# Patient Record
Sex: MALE | Race: OTHER | HISPANIC OR LATINO | URBAN - METROPOLITAN AREA
[De-identification: names, ages, dates, MRNs, and addresses within clinical notes are randomized per-mention and may not be internally consistent; named-entity substitution may affect disease eponyms.]

---
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
## 2021-04-04 PROBLEM — Z00.00 ENCOUNTER FOR PREVENTIVE HEALTH EXAMINATION: Status: ACTIVE | Noted: 2021-04-04

## 2022-02-05 ENCOUNTER — EMERGENCY (EMERGENCY)
Facility: HOSPITAL | Age: 22
LOS: 1 days | Discharge: HOME | End: 2022-02-05
Attending: EMERGENCY MEDICINE | Admitting: EMERGENCY MEDICINE
Payer: SELF-PAY

## 2022-02-05 VITALS
HEART RATE: 77 BPM | RESPIRATION RATE: 16 BRPM | DIASTOLIC BLOOD PRESSURE: 80 MMHG | TEMPERATURE: 98 F | HEIGHT: 70 IN | OXYGEN SATURATION: 99 % | WEIGHT: 179.9 LBS | SYSTOLIC BLOOD PRESSURE: 150 MMHG

## 2022-02-05 PROCEDURE — 99283 EMERGENCY DEPT VISIT LOW MDM: CPT

## 2022-02-05 PROCEDURE — 99053 MED SERV 10PM-8AM 24 HR FAC: CPT

## 2022-02-06 PROCEDURE — 73130 X-RAY EXAM OF HAND: CPT | Mod: 26,LT

## 2022-02-06 PROCEDURE — 73130 X-RAY EXAM OF HAND: CPT

## 2022-02-06 PROCEDURE — 99283 EMERGENCY DEPT VISIT LOW MDM: CPT | Mod: 25

## 2022-02-06 RX ORDER — ACETAMINOPHEN 500 MG
650 TABLET ORAL ONCE
Refills: 0 | Status: DISCONTINUED | OUTPATIENT
Start: 2022-02-06 | End: 2022-02-09

## 2022-02-06 NOTE — ED PROVIDER NOTE - PATIENT PORTAL LINK FT
You can access the FollowMyHealth Patient Portal offered by [facility] by registering at the following website: [email or web address]. By joining [employer]’s FollowMyHealth portal, you will also be able to view your health information using other applications (apps) compatible with our system.

## 2022-02-06 NOTE — ED PROVIDER NOTE - NSFOLLOWUPINSTRUCTIONS_ED_ALL_ED_FT
Finger Sprain, Adult      A finger sprain is a tear or stretch in a ligament in a finger. Ligaments are tissues that connect bones to each other.      What are the causes?    Finger sprains happen when something makes the bones in the hand move in an abnormal way. They are often caused by a fall or accident.      What increases the risk?    This condition is more likely to develop in people who:  •Participate in sports in which it is easy to fall, such as skiing.      •Play sports that involve catching an object, such as basketball.      •Have poor strength and flexibility.        What are the signs or symptoms?    Symptoms of this condition include:  •Pain or tenderness in the finger.      •Swelling in the finger.      •Bluish appearance to the finger.      •Bruising.      •Difficulty bending and flexing the finger.        How is this diagnosed?    This condition is diagnosed with an exam of your finger. Your health care provider may do an X-ray to see if any bones are broken or dislocated.      How is this treated?     Treatment for this condition depends on how severe the sprain is. It may involve:  •Preventing the finger from moving for a period of time. Your finger may be wrapped in a bandage (dressing), splint, or cast, or your finger may be taped to the fingers beside it (dmitri taping).      •Keeping the hand raised (elevated) above the level of the heart during rest and sleep.      •Medicines for pain.      •Exercises to strengthen the finger. These may be recommended when the finger has healed.      •Surgery to reconnect the ligament to a bone. This may be done if the ligament was torn all the way.        Follow these instructions at home:    If you have a splint:     • Do not put pressure on any part of the splint until it is fully hardened. This may take several hours.      •Wear the splint as told by your health care provider. Remove it only as told by your health care provider.      •Loosen the splint if your fingers tingle, become numb, or turn cold and blue.      •Keep the splint clean.    •If the splint is not waterproof:  •Do not let it get wet.      •Cover it with a watertight covering when you take a bath or a shower.        If you have a cast:     • Do not put pressure on any part of the cast until it is fully hardened. This may take several hours.      • Do not stick anything inside the cast to scratch your skin. Doing that increases your risk of infection.      •Check the skin around the cast every day. Tell your health care provider about any concerns.      •You may put lotion on dry skin around the edges of the cast. Do not put lotion on the skin underneath the cast.      •Keep the cast clean.    •If the cast is not waterproof:  •Do not let it get wet.      •Cover it with a watertight covering when you take a bath or shower.        Managing pain, stiffness, and swelling   •If directed, put ice on the injured area:  •If you have a removable splint, remove it as told by your health care provider.      •Put ice in a plastic bag.      •Place a towel between your skin and the bag or between your cast and the bag.      •Leave the ice on for 20 minutes, 2–3 times a day.        •Gently move your fingers often to avoid stiffness and to lessen swelling.      •Elevate the injured area above the level of your heart while you are sitting or lying down.      Medicines     •Take over-the-counter and prescription medicines only as told by your health care provider.      • Do not drive or use heavy machinery while taking prescription pain medicine.      General instructions     •Keep any dressings dry until your health care provider says they can be removed.      •Do exercises as told by your health care provider or physical therapist.      • Do not wear rings on your injured finger.      •Keep all follow-up visits as told by your health care provider. This is important.        Get help right away if:    •Your pain is not controlled with medicine.      •Your bruising or swelling gets worse.      •Your splint or cast is damaged.      •Your finger is numb or blue.      •Your finger feels colder to the touch than normal.      •You develop a fever.        Summary    •A finger sprain is a tear or stretch in a ligament in a finger. Ligaments are tissues that connect bones to each other.      •Finger sprains happen when something makes the bones in the hand move in an abnormal way. They are often caused by a fall or accident.      •This condition is diagnosed with an exam of your finger. Your health care provider may do an X-ray to see if any bones are broken or dislocated.      •Treatment for this condition depends on how severe the sprain is. Treatment may involve wearing a splint or cast. Surgery to reconnect the ligament to a bone may be needed if the ligament was torn all the way.      This information is not intended to replace advice given to you by your health care provider. Make sure you discuss any questions you have with your health care provider.      Document Revised: 11/30/2018 Document Reviewed: 03/09/2018    Elsevier Patient Education © 2021 [employer]
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
## 2023-01-05 NOTE — ED PROVIDER NOTE - PSYCHIATRIC, MLM
Alert and oriented to person, place, time/situation. normal mood and affect. no apparent risk to self or others. Show Additional Area 5: Yes

## 2024-05-04 ENCOUNTER — EMERGENCY (EMERGENCY)
Facility: HOSPITAL | Age: 24
LOS: 1 days | Discharge: HOME | End: 2024-05-04
Admitting: EMERGENCY MEDICINE
Payer: COMMERCIAL

## 2024-05-04 VITALS
HEART RATE: 84 BPM | SYSTOLIC BLOOD PRESSURE: 124 MMHG | RESPIRATION RATE: 18 BRPM | DIASTOLIC BLOOD PRESSURE: 80 MMHG | TEMPERATURE: 98 F | OXYGEN SATURATION: 99 %

## 2024-05-04 VITALS
RESPIRATION RATE: 18 BRPM | DIASTOLIC BLOOD PRESSURE: 72 MMHG | OXYGEN SATURATION: 98 % | HEART RATE: 75 BPM | TEMPERATURE: 97 F | SYSTOLIC BLOOD PRESSURE: 119 MMHG

## 2024-05-04 PROCEDURE — 12002 RPR S/N/AX/GEN/TRNK2.6-7.5CM: CPT

## 2024-05-04 PROCEDURE — 73120 X-RAY EXAM OF HAND: CPT | Mod: 26,LT

## 2024-05-04 PROCEDURE — 99284 EMERGENCY DEPT VISIT MOD MDM: CPT | Mod: 25

## 2024-05-04 RX ORDER — ACETAMINOPHEN 500 MG
975 TABLET ORAL ONCE
Refills: 0 | Status: COMPLETED | OUTPATIENT
Start: 2024-05-04 | End: 2024-05-04

## 2024-05-04 RX ORDER — TETANUS TOXOID, REDUCED DIPHTHERIA TOXOID AND ACELLULAR PERTUSSIS VACCINE, ADSORBED 5; 2.5; 8; 8; 2.5 [IU]/.5ML; [IU]/.5ML; UG/.5ML; UG/.5ML; UG/.5ML
0.5 SUSPENSION INTRAMUSCULAR ONCE
Refills: 0 | Status: COMPLETED | OUTPATIENT
Start: 2024-05-04 | End: 2024-05-04

## 2024-05-04 RX ADMIN — Medication 975 MILLIGRAM(S): at 21:57

## 2024-05-04 RX ADMIN — TETANUS TOXOID, REDUCED DIPHTHERIA TOXOID AND ACELLULAR PERTUSSIS VACCINE, ADSORBED 0.5 MILLILITER(S): 5; 2.5; 8; 8; 2.5 SUSPENSION INTRAMUSCULAR at 21:58
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
## 2024-05-04 NOTE — ED PROVIDER NOTE - PATIENT PORTAL LINK FT
You can access the FollowMyHealth Patient Portal offered by [facility] by registering at the following website: [email or web address]. By joining [employer]’s FollowMyHealth portal, you will also be able to view your health information using other applications (apps) compatible with our system.
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
## 2024-05-04 NOTE — ED PROVIDER NOTE - NSFOLLOWUPINSTRUCTIONS_ED_ALL_ED_FT
Please follow up with Brian Garcia within a week.   Orthopaedic Surgery  [address] [facility], [address]  [place] [address]  Phone: [phone]  Fax: [phone]  Follow Up Time:         Laceration    A laceration is a cut that goes through all of the layers of the skin and into the tissue that is right under the skin. Some lacerations heal on their own. Others need to be closed with skin adhesive strips, skin glue, stitches (sutures), or staples. Proper laceration care minimizes the risk of infection and helps the laceration to heal better.  If non-absorbable stitches or staples have been placed, they must be taken out within the time frame instructed by your healthcare provider.    SEEK IMMEDIATE MEDICAL CARE IF YOU HAVE ANY OF THE FOLLOWING SYMPTOMS: swelling around the wound, worsening pain, drainage from the wound, red streaking going away from your wound, inability to move finger or toe near the laceration, or discoloration of skin near the laceration.    You can use 500-1000mg Tylenol every 6 hours for pain - as needed.  This is an over-the-counter medications - please respect the warnings on the label. This medication come with certain risks and side effects that you need to discuss with your doctor, especially if you are taking it for a prolonged period.    You can use 400-600mg Ibuprofen (such as motrin or advil) every 6 to 8 hours as needed for pain control.  Take ibuprofen with food or milk to lessen stomach upset.  This is an over-the-counter medication please respect the warnings on the label. All medications come with certain risks and side effects that you need to discuss with your doctor, especially if you are taking them for a prolonged period.

## 2024-05-04 NOTE — ED PROCEDURE NOTE - NUMBER OF RETENTION SUTURES
8 Sski Pregnancy And Lactation Text: This medication is Pregnancy Category D and isn't considered safe during pregnancy. It is excreted in breast milk.
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
## 2024-05-04 NOTE — ED ADULT TRIAGE NOTE - CHIEF COMPLAINT QUOTE
pt s/p laceration to left hand over palm of hand and thumb area. laceration is deep, dressing over site. placed.

## 2024-05-04 NOTE — ED PROVIDER NOTE - CARE PROVIDER_API CALL
normal (ped)... Brian Garcia jennifer  Orthopaedic Surgery  [address] [place] [address], [facility] [address]  [place] [address]  Phone: [phone]  Fax: [phone]  Follow Up Time:

## 2024-05-05 PROBLEM — Z78.9 OTHER SPECIFIED HEALTH STATUS: Chronic | Status: ACTIVE | Noted: 2022-02-06

## 2024-05-05 RX ADMIN — Medication 1 TABLET(S): at 00:07

## 2024-05-17 ENCOUNTER — APPOINTMENT (OUTPATIENT)
Dept: ORTHOPEDIC SURGERY | Facility: CLINIC | Age: 24
End: 2024-05-17

## 2024-05-21 ENCOUNTER — APPOINTMENT (OUTPATIENT)
Dept: ORTHOPEDIC SURGERY | Facility: CLINIC | Age: 24
End: 2024-05-21

## 2024-09-08 ENCOUNTER — EMERGENCY (EMERGENCY)
Facility: HOSPITAL | Age: 24
LOS: 1 days | Discharge: HOME | End: 2024-09-08
Attending: EMERGENCY MEDICINE

## 2024-09-08 VITALS
SYSTOLIC BLOOD PRESSURE: 115 MMHG | RESPIRATION RATE: 18 BRPM | TEMPERATURE: 98 F | DIASTOLIC BLOOD PRESSURE: 72 MMHG | OXYGEN SATURATION: 99 % | HEART RATE: 78 BPM

## 2024-09-08 VITALS
SYSTOLIC BLOOD PRESSURE: 117 MMHG | WEIGHT: 169.98 LBS | TEMPERATURE: 99 F | DIASTOLIC BLOOD PRESSURE: 79 MMHG | HEIGHT: 71 IN | RESPIRATION RATE: 18 BRPM | HEART RATE: 77 BPM | OXYGEN SATURATION: 99 %

## 2024-09-08 PROCEDURE — 99283 EMERGENCY DEPT VISIT LOW MDM: CPT

## 2024-09-08 PROCEDURE — 99282 EMERGENCY DEPT VISIT SF MDM: CPT

## 2024-09-08 NOTE — ED PROVIDER NOTE - PATIENT PORTAL LINK FT
You can access the FollowMyHealth Patient Portal offered by [facility] by registering at the following website: [email or web address]. By joining [employer]’s FollowMyHealth portal, you will also be able to view your health information using other applications (apps) compatible with our system.

## 2024-09-08 NOTE — ED ADULT NURSE NOTE - OBJECTIVE STATEMENT
The pt is a 24 y M with no PMH. came in today with co left lower lip swelling after biting it 4 months ago. Pt was noted to have no blood or puss from the site, pt is AOx4 breathing evenly on room air and able to speak in full sentences. PT denied head ache. fevers, chills  N,V,D head ache vision changes. Pt came in today co swelling for months and no decrease in swelling

## 2024-09-08 NOTE — ED PROVIDER NOTE - OBJECTIVE STATEMENT
Attending MD Garcia:     Seen in [place] [facility], accompanied by loved one    24M with no reported contributory PMH/PSH/Meds, no known drug allergies presents to the ED with 3 months of L lower lip collection.  Reports that about three months ago bit his L lower lip and since then has had a bump.  Reports occasionally bites the bump but denies bleeding or purulence from bump. Denies facial redness, fevers, chills.  Denies acute change in appearance of lip or face.  Reports has not sought care for this previously.      Exam:   General: NAD  HENT: head NCAT, airway patent, +L lower lip with area of collection on  buccal mucosa, no induration, ~1.5cm, nontender, no surrounding erythema, no facial erythema, FROM jaw  Chest: symmetric chest rise, no increased work of breathing  MSK: ranging neck freely  Neuro: moving all extremities spontaneously, sensory grossly intact, no gross neuro deficits  Psych: normal mood and affect     A/P: 24M with collection at L lower lip x 3 months, no acute change, discussed with dental for best outpatient clinic, recommend Oral Pathology/OMFS, discussed with patient.  Stable for discharge.  Follow up instructions given, importance of follow up emphasized, return to ED parameters reviewed and patient verbalized understanding.  All questions answered, all concerns addressed.

## 2024-09-08 NOTE — ED ADULT NURSE NOTE - MODE OF DISCHARGE
[FreeTextEntry1] : DM type:TAMMY\par Severity:moderate\par Duration:10 years\par Onset:rapid weight loss and A1C of 13%\par \par Associated symptoms or complications:none\par \par Current regimen:\par metformin 1000 bid\par januvia 100\par glimepiride 2\par tresiba 12\par \par Pt. presented 10 years ago with rapid weight loss and an A1C level of 13%. Prior to this was told that his blood sugar was a little high, but no action was taken. he was started on kombiglyze with rapid glucose lowering and hypoglycemia; then placed on metformin. Control was good for years, but eventually he required the addition of januvia.\par During the past year diabetic control has declined, with A1C measured to a level of 10%; there has been coincident stress, and pt. has been less adherent with diet. \par A1C improved to 8.4% and now 7.8%. Strongly JOSÉ MANUEL positive, with low but not absent c-peptide. Responding to glimepiride.\par Started on tresiba 9/2018.\par \par PMH:\par psoriasis, on humira\par 
Ambulatory

## 2024-09-08 NOTE — ED ADULT TRIAGE NOTE - RESPIRATORY RATE (BREATHS/MIN)
[de-identified] : 
18

## 2024-09-08 NOTE — ED PROVIDER NOTE - NSFOLLOWUPINSTRUCTIONS_ED_ALL_ED_FT
YOU WERE SEEN IN THE ED FOR: a collection to the left side of your lower lip since biting it about three months ago.      Please call to set up an appointment with Oral Pathology/Oral Maxillofacial Surgery [phone].      RETURN TO THE EMERGENCY DEPARTMENT IF YOU EXPERIENCE ANY NEW/CONCERNING/WORSENING SYMPTOMS SUCH AS BUT NOT LIMITED TO: tongue swelling, lip swelling, overlying facial redness, increased pain in any area of body, difficulty swallowing, purulent or bloody discharge from area of concern or any other concern.

## 2024-09-08 NOTE — ED PROVIDER NOTE - NSFOLLOWUPCLINICS_GEN_ALL_ED_FT
Oral & Maxillofacial Surgery  Department of Dental Medicine  [address] [place] [address]  Phone: [phone]  Fax: [phone]  Follow Up Time: 4-6 Days

## 2024-10-10 ENCOUNTER — APPOINTMENT (OUTPATIENT)
Age: 24
End: 2024-10-10

## 2024-10-10 PROCEDURE — 40812 EXCISE/REPAIR MOUTH LESION: CPT

## 2024-10-10 PROCEDURE — 99203 OFFICE O/P NEW LOW 30 MIN: CPT | Mod: 25

## 2025-07-27 ENCOUNTER — EMERGENCY (EMERGENCY)
Facility: HOSPITAL | Age: 25
LOS: 1 days | End: 2025-07-27
Attending: STUDENT IN AN ORGANIZED HEALTH CARE EDUCATION/TRAINING PROGRAM | Admitting: STUDENT IN AN ORGANIZED HEALTH CARE EDUCATION/TRAINING PROGRAM
Payer: SELF-PAY

## 2025-07-27 VITALS
RESPIRATION RATE: 18 BRPM | HEIGHT: 69 IN | DIASTOLIC BLOOD PRESSURE: 76 MMHG | TEMPERATURE: 98 F | OXYGEN SATURATION: 96 % | SYSTOLIC BLOOD PRESSURE: 124 MMHG | WEIGHT: 149.91 LBS | HEART RATE: 88 BPM

## 2025-07-27 PROCEDURE — 99053 MED SERV 10PM-8AM 24 HR FAC: CPT

## 2025-07-27 PROCEDURE — 12011 RPR F/E/E/N/L/M 2.5 CM/<: CPT

## 2025-07-27 PROCEDURE — 99284 EMERGENCY DEPT VISIT MOD MDM: CPT | Mod: 25
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
## 2025-07-28 RX ORDER — BACITRACIN 500 UNIT/G
1 OINTMENT (GRAM) TOPICAL
Qty: 1 | Refills: 0
Start: 2025-07-28 | End: 2025-08-03

## 2025-07-28 RX ORDER — LIDOCAINE HCL/EPINEPHRINE/PF 1 %-1:200K
30 AMPUL (ML) INJECTION ONCE
Refills: 0 | Status: COMPLETED | OUTPATIENT
Start: 2025-07-28 | End: 2025-07-28

## 2025-07-28 RX ADMIN — Medication 30 MILLILITER(S): at 01:02
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
## 2025-07-28 NOTE — ED ADULT NURSE NOTE - PAIN: PRESENCE, MLM
Initiate Treatment: Clindamycin gel Render In Strict Bullet Format?: No Detail Level: Zone denies pain/discomfort (Rating = 0) complains of pain/discomfort
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
## 2025-07-28 NOTE — ED PROCEDURE NOTE - CPROC ED POST PROC CARE GUIDE1
CMP order from December extended.  Please advise if you would like anything else draw before upcoming appt?   Verbal/written post procedure instructions were given to patient/caregiver./Instructed patient/caregiver to follow-up with primary care physician./Instructed patient/caregiver regarding signs and symptoms of infection.
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
## 2025-07-28 NOTE — ED PROVIDER NOTE - PROGRESS NOTE DETAILS
Rolando HOPE, PGY-3;  Repaired laceration, will provide return precautions. Patient agreeable to discharge.
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
## 2025-07-28 NOTE — ED PROVIDER NOTE - PATIENT PORTAL LINK FT
You can access the FollowMyHealth Patient Portal offered by [facility] by registering at the following website: [email or web address]. By joining [employer]’s FollowMyHealth portal, you will also be able to view your health information using other applications (apps) compatible with our system.

## 2025-07-28 NOTE — ED PROVIDER NOTE - NSFOLLOWUPINSTRUCTIONS_ED_ALL_ED_FT
Today you are evaluated at [facility] ED for a cut to your face.      While you were here we applied stitches.    Monitor the area for warmth, worsening redness, drainage from the wound.    Return to ED for fever, worst headache of your life,     Keep area clean.    Apply ointment to the area twice a day.